# Patient Record
(demographics unavailable — no encounter records)

---

## 2025-02-24 NOTE — REASON FOR VISIT
[Annual] : an annual visit. [Dysmenorrhea] : dysmenorrhea [Pelvic Pain] : pelvic pain [Infertility] : infertility

## 2025-03-01 NOTE — DISCUSSION/SUMMARY
[FreeTextEntry1] :  GYN Annual evaluation today -pap smear and vaginal cultures sent TVU done: Anteverted uterus is noted, Endometrium measures 9.24mm, Polycystic bilateral ovaries We discussed - Dysmenorrhea, fibroid uterus and PCOS causes, symptoms and treatment options. Patient verbalized understanding of all explanations, and her questions were answered, and all concerns were addressed. Patient was screened for depression - no signs of clinical depression. PHQ-2 on file Sent Metformin and Diflucan to her pharmacy. RTO in 6 months for f/u TVS   I, Nilay vincent acting as scribe for Dr. Morales. 02/24/2025     The documentation recorded by the scribe, in my presence, accurately reflects the service I personally performed, and the decisions made by me with my edits as appropriate on 03/01/2025  Lyric Morales MD, FACOG

## 2025-03-01 NOTE — HISTORY OF PRESENT ILLNESS
[Patient reported PAP Smear was normal] : Patient reported PAP Smear was normal [N] : Patient does not use contraception [FreeTextEntry1] : GYN Annual [TextBox_4] : 34YO patient presents for GYN annual exam and MRI results. Hx of PCOS. Patient states she would like to conceive but has been experiencing pelvic pain with dysmenorrhea. Declines surgical options. Patient reports she will go to  for her KO[facility] treatment. [PapSmeardate] : 2/21/24 [LMPDate] : 2/1/25

## 2025-03-01 NOTE — HISTORY OF PRESENT ILLNESS
[Patient reported PAP Smear was normal] : Patient reported PAP Smear was normal [N] : Patient does not use contraception [FreeTextEntry1] : GYN Annual [TextBox_4] : 36YO patient presents for GYN annual exam and MRI results. Hx of PCOS. Patient states she would like to conceive but has been experiencing pelvic pain with dysmenorrhea. Declines surgical options. Patient reports she will go to  for her KOJO treatment. [PapSmeardate] : 2/21/24 [LMPDate] : 2/1/25

## 2025-03-01 NOTE — DISCUSSION/SUMMARY
[FreeTextEntry1] :  GYN Annual evaluation today -pap smear and vaginal cultures sent TVU done: Anteverted uterus is noted, Endometrium measures 9.24mm, Polycystic bilateral ovaries We discussed - Dysmenorrhea, fibroid uterus and PCOS causes, symptoms and treatment options. Patient verbalized understanding of all explanations, and her questions were answered, and all concerns were addressed. Patient was screened for depression - no signs of clinical depression. PHQ-2 on file Sent Metformin and Diflucan to her pharmacy. RTO in 6 months for f/u TVS   I, Nilay vincent acting as scribe for Dr. Mroales. 02/24/2025     The documentation recorded by the scribe, in my presence, accurately reflects the service I personally performed, and the decisions made by me with my edits as appropriate on 03/01/2025  Lyric Morales MD, FACOG

## 2025-03-01 NOTE — PHYSICAL EXAM
[Chaperone Present] : A chaperone was present in the examining room during all aspects of the physical examination [Appropriately responsive] : appropriately responsive [Alert] : alert [No Acute Distress] : no acute distress [No Lymphadenopathy] : no lymphadenopathy [Soft] : soft [Non-tender] : non-tender [Non-distended] : non-distended [Oriented x3] : oriented x3 [Examination Of The Breasts] : a normal appearance [Breast Palpation Diffuse Fibrous Tissue Bilateral] : fibrocystic changes [No Masses] : no breast masses were palpable [Labia Majora] : normal [Labia Minora] : normal [Discharge] : a  ~M vaginal discharge was present [Moderate] : moderate [Thick] : thick [Normal] : normal [Uterine Adnexae] : normal [FreeTextEntry2] : MICHAEL Red [FreeTextEntry6] : No tenderness, No nipple discharge and no adenopathy.